# Patient Record
Sex: FEMALE | Race: OTHER | HISPANIC OR LATINO | URBAN - METROPOLITAN AREA
[De-identification: names, ages, dates, MRNs, and addresses within clinical notes are randomized per-mention and may not be internally consistent; named-entity substitution may affect disease eponyms.]

---

## 2022-06-09 ENCOUNTER — EMERGENCY (EMERGENCY)
Facility: HOSPITAL | Age: 72
LOS: 1 days | Discharge: HOME | End: 2022-06-09
Admitting: EMERGENCY MEDICINE
Payer: MEDICARE

## 2022-06-09 VITALS
RESPIRATION RATE: 16 BRPM | TEMPERATURE: 98 F | WEIGHT: 145.06 LBS | SYSTOLIC BLOOD PRESSURE: 144 MMHG | DIASTOLIC BLOOD PRESSURE: 77 MMHG | OXYGEN SATURATION: 97 % | HEART RATE: 72 BPM

## 2022-06-09 PROCEDURE — 99284 EMERGENCY DEPT VISIT MOD MDM: CPT | Mod: 25

## 2022-06-09 PROCEDURE — 73110 X-RAY EXAM OF WRIST: CPT | Mod: 26,LT

## 2022-06-09 PROCEDURE — 73090 X-RAY EXAM OF FOREARM: CPT | Mod: 26,LT

## 2022-06-09 RX ORDER — TRAMADOL HYDROCHLORIDE 50 MG/1
50 TABLET ORAL ONCE
Refills: 0 | Status: DISCONTINUED | OUTPATIENT
Start: 2022-06-09 | End: 2022-06-09

## 2022-06-09 RX ADMIN — TRAMADOL HYDROCHLORIDE 50 MILLIGRAM(S): 50 TABLET ORAL at 13:41

## 2022-06-09 NOTE — ED ADULT NURSE NOTE - CHIEF COMPLAINT QUOTE
Pt BIBA from [facility]. Tripped with [employer]. Lt wrist pain immobilized by EMS who report positive deformity. No head strike, no LOC.

## 2022-06-09 NOTE — ED PROVIDER NOTE - MUSCULOSKELETAL, MLM
+deformity noted along the distal L forearm, ulnar side w/ ttp in that area, dec ROM of the wrist 2/2 pain, no overlying erythema or ecchymosis, dec  strength of the L hand d/t pain

## 2022-06-09 NOTE — ED PROVIDER NOTE - NSFOLLOWUPINSTRUCTIONS_ED_ALL_ED_FT
Ulnar Fracture    Bones of the arm and hand featuring the radius and ulna. There is a break, or fracture, in the ulna.   An ulnar fracture is a break in the ulna bone. The ulna is a bone in the forearm, on the same side as the little finger. The forearm is the part of the arm that is between the elbow and the wrist. It is made up of two bones: the radius and the ulna. You can feel the ulna on the outside of the wrist and at the point of the elbow.    An ulnar fracture can happen near the wrist, near the elbow, or in the middle of the forearm. In many cases of ulnar fracture, the radius is also fractured.      What are the causes?    This condition is usually caused by a direct hit or stress to the forearm. This may result from:  •An accident, such as a car or bike accident.      •Falling with the arm outstretched.        What increases the risk?    You may be more likely to fracture your ulna if you:  •Play contact sports.      •Have a condition that causes your bones to become thin and brittle (osteoporosis).        What are the signs or symptoms?    Signs and symptoms may include:  •Pain immediately after the injury.      •An abnormal bend or bump in the arm (deformity).      •Swelling.      •Bruising.      •Numbness or weakness in your hand.      •Inability to turn your hand from side to side.        How is this diagnosed?    This condition may be diagnosed based on:  •Your symptoms and medical history.      •A physical exam.      •An X-ray.        How is this treated?    Treatment depends on how severe your fracture is, where it is, and how the pieces of the broken bone line up with each other (alignment).  •The first step in treatment may be for you to wear a temporary splint for a few days. After the swelling goes down, you may get a cast, get a different type of splint, or have surgery.      •If your broken bone is in good alignment, you will need to wear a splint or cast for several weeks.    •If your broken bone is not aligned (is displaced), your health care provider will need to align the bone pieces. After alignment, you will need to wear a splint or cast for up to 6 weeks. To align your broken bone, your health care provider may:  •Move the bones back into position without surgery (closed reduction).      •Perform surgery to align the fracture and fix the bone pieces into place with metal screws, plates, or wires (open reduction and internal fixation, ORIF).      •Perform surgery to align the fracture and fix the bone pieces into place with pins that are attached to a stabilizing bar outside your skin (external fixation).        Treatment may also include:  •Having your cast changed after 2–3 weeks.      •Physical therapy.      •Follow-up visits and X-rays to make sure you are healing.        Follow these instructions at home:    If you have a splint:     •Wear it as told by your health care provider. Remove it only as told by your health care provider.      •Loosen the splint if your fingers tingle, become numb, or turn cold and blue.      •Keep the splint clean and dry.      If you have a cast:     • Do not stick anything inside the cast to scratch your skin. Doing that increases your risk for infection.      •Check the skin around the cast every day. Tell your health care provider about any concerns.      •You may put lotion on dry skin around the edges of the cast. Do not put lotion on the skin underneath the cast.      •Keep the cast clean and dry.      Bathing     • Do not take baths, swim, or use a hot tub until your health care provider approves. Ask your health care provider if you may take showers. You may only be allowed to take sponge baths.    •If your splint or cast is not waterproof:  •Do not let it get wet.      •Cover it with a watertight covering when you take a bath or a shower.        Activity     • Do not lift anything with your injured arm.      • Do not use the injured arm to support your body weight until your health care provider says that you can.      •Ask your health care provider what activities are safe for you during recovery, and ask what activities you need to avoid.        Managing pain, stiffness, and swelling   Bag of ice on a towel on the skin. •If directed, put ice on painful areas:  •If you have a removable splint, remove it as told by your health care provider.      •Put ice in a plastic bag.      •Place a towel between your skin and the bag, or between your cast and the bag.      •Leave the ice on for 20 minutes, 2–3 times a day.        •Move your fingers often to avoid stiffness and to lessen swelling.      •Raise (elevate) the injured area above the level of your heart while you are sitting or lying down.      General instructions     • Do not put pressure on any part of the cast or splint until it is fully hardened. This may take several hours.      •Take over-the-counter and prescription medicines only as told by your health care provider.      • Do not drive until your health care provider approves. You should not drive or use heavy machinery while taking prescription pain medicine.      • Do not use any products that contain nicotine or tobacco, such as cigarettes and e-cigarettes. These can delay bone healing. If you need help quitting, ask your health care provider.       •Keep all follow-up visits as told by your health care provider. This is important.        Contact a health care provider if you have:    •Pain that does not get better with medicine.      •Swelling that gets worse.      •A bad smell coming from your cast.        Get help right away if:    •You cannot move your fingers.      •You have severe pain.    •Your fingers or your hand:  •Become numb, cold, or pale.      •Turn a bluish color.          Summary    •An ulnar fracture is a break in the ulna bone, which is the bone in your forearm that is on the same side as your little finger.      •You may need to wear a splint or a cast for at least several weeks. Sometimes surgery is needed.      •Keep all follow-up visits as told by your health care provider.      This information is not intended to replace advice given to you by your health care provider. Make sure you discuss any questions you have with your health care provider.

## 2022-06-09 NOTE — ED PROVIDER NOTE - PATIENT PORTAL LINK FT
You can access the FollowMyHealth Patient Portal offered by [facility] by registering at the following website: [email or web address]. By joining [employer]’s FollowMyHealth portal, you will also be able to view your health information using other applications (apps) compatible with our system.

## 2022-06-09 NOTE — ED PROVIDER NOTE - IV ALTEPLASE INCLUSION HIDDEN
Pt resting in bed. No s/s distress at this time. Pt laying on R side. States she just wants to sleep, but does \"feel better\". Will continue to monitor.       Mahesh Iraheta RN  11/24/20 9421 show

## 2022-06-09 NOTE — ED PROVIDER NOTE - OBJECTIVE STATEMENT
70 yo F, denies pmhx, presenting to the ED c/o L wrist pain after mechanical fall. Pt states she fell w/ an outstretched hand, resulting in the injury. Pt denies hitting her head or LOC. Further denies numbness, weakness, headaches, dizziness, chest pain, SOB, fevers, or chills.++  R hand dominant.

## 2022-06-09 NOTE — ED PROVIDER NOTE - CLINICAL SUMMARY MEDICAL DECISION MAKING FREE TEXT BOX
70 yo F, denies pmhx, presenting to the ED c/o L wrist pain after mechanical fall. Patient found to have deformity of the L distal forearm concerning for ulnar fracture. Will obtain XRs of the wrist and forearm to further eval area. PO tramadol given for pain. Reassess, likely ortho f/u.

## 2022-06-09 NOTE — ED PROVIDER NOTE - PROGRESS NOTE DETAILS
+ulna fracture  Pt placed in posterior long arm splint  Will give Dr. Mcmahan info for ortho f/u - patient also given copy of images since she will return home to [place] this Sunday  Return precautions discussed  Pt stable on DC and leaves in NAD.

## 2022-06-09 NOTE — ED ADULT NURSE NOTE - NSIMPLEMENTINTERV_GEN_ALL_ED
Implemented All Fall Risk Interventions:  [place] to call system. Call bell, personal items and telephone within reach. Instruct patient to call for assistance. Room bathroom lighting operational. Non-slip footwear when patient is off stretcher. Physically safe environment: no spills, clutter or unnecessary equipment. Stretcher in lowest position, wheels locked, appropriate side rails in place. Provide visual cue, wrist band, yellow gown, etc. Monitor gait and stability. Monitor for mental status changes and reorient to person, place, and time. Review medications for side effects contributing to fall risk. Reinforce activity limits and safety measures with patient and family.

## 2022-06-09 NOTE — ED PROVIDER NOTE - CARE PROVIDER_API CALL
Raheem Mcmahan)  Orthopaedic Surgery  [address] [place] [address], [address]  [place] [address]  Phone: [phone]  Fax: [phone]  Follow Up Time: Urgent

## 2022-06-09 NOTE — ED ADULT TRIAGE NOTE - CHIEF COMPLAINT QUOTE
Pt BIBA from [facility] Tripped with [employer]. Lt wrist pain immobilized by EMS who report positive deformity. No head strike, no LOC.

## 2022-06-11 DIAGNOSIS — S52.602A UNSPECIFIED FRACTURE OF LOWER END OF LEFT ULNA, INITIAL ENCOUNTER FOR CLOSED FRACTURE: ICD-10-CM
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
## 2022-06-11 DIAGNOSIS — M25.532 PAIN IN LEFT WRIST: ICD-10-CM
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
## 2022-06-14 NOTE — ED POST DISCHARGE NOTE - DETAILS
she is in posterior splint but is not rotating the wrist. informed of distal radius fracture. pt states she has follow up with orthopedist in 2 days in [place]. all questions answered.

## 2024-04-25 NOTE — ED ADULT TRIAGE NOTE - NS ED NURSE BANDS TYPE
PT Re-Evaluation     Today's date: 2024  Patient name: Dianelys Kim  : 1944  MRN: [id number]  Referring provider: Raudel Thornton MD  Dx:   Encounter Diagnosis     ICD-10-CM    1. Closed 4-part fracture of proximal end of right humerus with routine healing, subsequent encounter  S42.291D           Start Time: 1014  Stop Time: 1054  Total time in clinic (min): 40 minutes    Assessment  Assessment details: Patient is showing steady progress with improving ROM and strength.  She would benefit with continued PT to restore prior level of function.  Impairments: abnormal or restricted ROM, activity intolerance, impaired physical strength and pain with function    Goals  STG  Initiate HEP  Decrease pain by 50% in 3 weeks  Patient performing HEP 50% of time in 3 weeks  LTG  Independent with HEP  Decrease pain by 90% in 6 weeks  Patient performing HEP 90% of time in 6 weeks  FOTO > 50    in 6 weeks         Subjective Evaluation    History of Present Illness  Mechanism of injury: Patient continues to report right shoulder pain, limited reaching and lifting ability.          Recurrent probem    Quality of life: good    Pain  Current pain ratin  At best pain ratin  At worst pain rating: 3  Location: right shoulder  Quality: dull ache  Relieving factors: change in position, medications and rest  Aggravating factors: overhead activity and lifting          Objective     Active Range of Motion   Left Shoulder   Flexion: 180 degrees   Abduction: 180 degrees   External rotation 0°: 80 degrees     Right Shoulder   Flexion: 120 degrees with pain  Abduction: 100 degrees with pain  External rotation 0°: 40 degrees with pain    Strength/Myotome Testing     Left Shoulder     Planes of Motion   Flexion: 5   Abduction: 5   External rotation at 0°: 5     Right Shoulder     Planes of Motion   Flexion: 3-   Abduction: 3-   External rotation at 0°: 3                   Name band;